# Patient Record
Sex: MALE | Race: WHITE | URBAN - METROPOLITAN AREA
[De-identification: names, ages, dates, MRNs, and addresses within clinical notes are randomized per-mention and may not be internally consistent; named-entity substitution may affect disease eponyms.]

---

## 2017-11-15 ENCOUNTER — OFFICE VISIT (OUTPATIENT)
Dept: SURGERY | Age: 37
End: 2017-11-15

## 2017-11-15 VITALS — SYSTOLIC BLOOD PRESSURE: 112 MMHG | WEIGHT: 157 LBS | DIASTOLIC BLOOD PRESSURE: 70 MMHG

## 2017-11-15 DIAGNOSIS — L05.01 PILONIDAL ABSCESS: Primary | ICD-10-CM

## 2017-11-15 PROCEDURE — 10081 I&D PILONIDAL CYST COMP: CPT | Performed by: SURGERY

## 2017-11-15 RX ORDER — AMOXICILLIN AND CLAVULANATE POTASSIUM 500; 125 MG/1; MG/1
1 TABLET, FILM COATED ORAL 2 TIMES DAILY
COMMUNITY

## 2017-11-15 NOTE — PATIENT INSTRUCTIONS
Plan: If packing was used, remove before shower tonight. Please repack wound twice a day for 2 days. Call office or come to ED if worsening redness, swelling, fevers, chills, or any other concerns.     Follow-up: 2 weeks for wound check

## 2017-11-29 ENCOUNTER — OFFICE VISIT (OUTPATIENT)
Dept: SURGERY | Age: 37
End: 2017-11-29

## 2017-11-29 VITALS — HEIGHT: 68 IN | BODY MASS INDEX: 23.79 KG/M2 | WEIGHT: 157 LBS

## 2017-11-29 DIAGNOSIS — L05.01 PILONIDAL ABSCESS: Primary | ICD-10-CM

## 2017-11-29 PROCEDURE — 99214 OFFICE O/P EST MOD 30 MIN: CPT | Performed by: SURGERY

## 2017-11-29 NOTE — PROGRESS NOTES
[address]  [address] [facility]  Dept: [phone]  Dept Fax: [phone]    Visit Date: 11/29/2017    Armando Delarosa is a 40 y.o. male who presents today for: Post-Op Check (2 wk follow up - I&D pilonidal)      HPI:       Armando Delarosa is a 40 y.o. male known to me for incision and drainage of pilonidal abscess 2 weeks ago. As noted previously, patient had a pilonidal excision done at another facility about 6 years ago. He had no issues until he presented about 2 weeks ago to the office with an abscess in the superior aspect of his previous excision site. I performed incision and drainage in the office with purulent material.  Today he is feeling much better. He denies fever, chills, nausea, vomiting, food intolerance, weight loss. The wound has been draining appropriately. He has been taking care of the wound and cleaning it daily. No past medical history on file. No past surgical history on file. Current Outpatient Prescriptions:     amoxicillin-clavulanate (AUGMENTIN) 500-125 MG per tablet, Take 1 tablet by mouth 2 times daily, Disp: , Rfl:   No Known Allergies  No past surgical history on file. No family history on file. Social History:   Social History   Substance Use Topics    Smoking status: Current Every Day Smoker    Smokeless tobacco: Never Used    Alcohol use Not on file      Tobacco cessation counseling provided as appropriate. REVIEW OF SYSTEMS:    Pertinent positives and negatives are mentioned in the HPI. Otherwise, all other systems were reviewed and negative. Objective:     Physical Exam   Ht 5' 8\" (1.727 m)   Wt 157 lb (71.2 kg)   BMI 23.87 kg/m²   Constitutional: Appears well-developed and well-nourished. Grooming appropriate. No gross deformities. Body mass index is 23.87 kg/m². Eyes: No scleral icterus. Conjunctiva/lids normal. Vision intact grossly.  Pupils equal/symmetric, reactive bilaterally. ENT: External ears/nose without defect, scars, or masses. Hearing grossly intact. No facial deformity. Lips normal, normal dentition. Neck: No masses. Trachea midline. No crepitus. Thyroid not enlarged. Cardiovascular: Normal rate. No peripheral edema. Abdominal aorta normal size to palpation. Pulmonary/Chest: Effort normal. No respiratory distress. No wheezes. No use of accessory muscles. Musculoskeletal: Normal range of motion x all 4 extremities and head/neck, without deformity, pain, or crepitus, with normal strength and tone. Normal gait. Nails without clubbing or cyanosis. Neurological: Alert and oriented to person, place, and time. No gross deficits. Sensation intact. Skin: Skin is dry. No rashes noted. No pallor. No induration of nodules. Psychiatric: Normal mood and affect. Behavior normal. Oriented to person, place, and time. Judgment and insight reasonable. Abdominal/wound: Incision and drainage site healing appropriately    Pertinent recent lab and radiology results reviewed       Assessment/Plan:       A/P:  Established problem(s): Recurrent pilonidal abscess status post drainage  Additional workup/treatment planned: Observation for now, likely recurrent redo complex pilonidal excision with reconstruction in the future  Risk of complications/morbidity: moderate    Patient is healing well from his incision and drainage site from 2 weeks ago. Overall he is doing well. I discussed with him the likely recurrence of his pilonidal disease, but for now we will continue with observation. Even if he were to require a redo pilonidal excision, we would like to wait at least 6 weeks for the tissue edema to decrease before planning his definitive excision. I briefly discussed this case with Dr. Iggy Peña of plastic surgery, who will be consulted on this patient during his next wound check in 4 weeks.   For now I encouraged him to continue with diligent wound care, and shave the hair around the area. DISPOSITION:  F/u 4 weeks    My findings will be relayed to consulting practitioner or PCP via Epic note    Total encounter time:  25 min with > 50% spent with face-to-face counseling and coordination of care, including: Discussion of pathophysiology and etiology of recurrent pilonidal disease. Discussion of next steps in nonsurgical management. Discussion of surgical management. Coordination and discussion with plastic surgery. Note completed using dictation software, please excuse any errors.     Electronically signed by Saúl Medina MD on 11/29/2017 at 5:31 PM

## 2018-01-02 ENCOUNTER — TELEPHONE (OUTPATIENT)
Dept: SURGERY | Age: 38
End: 2018-01-02

## 2018-01-02 NOTE — TELEPHONE ENCOUNTER
Patient had to cancel appointment scheduled 1/3/18 due to work conflict and would like to reschedule. This was a dual appointment with Dr Trevor Church.     Andrea Stafford [phone] (home)  [phone]